# Patient Record
Sex: FEMALE | Race: WHITE | NOT HISPANIC OR LATINO | Employment: FULL TIME | URBAN - METROPOLITAN AREA
[De-identification: names, ages, dates, MRNs, and addresses within clinical notes are randomized per-mention and may not be internally consistent; named-entity substitution may affect disease eponyms.]

---

## 2023-09-17 VITALS
DIASTOLIC BLOOD PRESSURE: 70 MMHG | SYSTOLIC BLOOD PRESSURE: 118 MMHG | WEIGHT: 189 LBS | BODY MASS INDEX: 29.66 KG/M2 | HEART RATE: 72 BPM | HEIGHT: 67 IN

## 2023-09-19 ENCOUNTER — HOSPITAL ENCOUNTER (OUTPATIENT)
Dept: DATA CONVERSION | Facility: HOSPITAL | Age: 48
Discharge: HOME | End: 2023-09-19
Payer: COMMERCIAL

## 2023-09-19 DIAGNOSIS — M21.70 UNEQUAL LIMB LENGTH (ACQUIRED), UNSPECIFIED SITE: ICD-10-CM

## 2023-09-19 DIAGNOSIS — M25.552 PAIN IN LEFT HIP: ICD-10-CM

## 2023-09-19 DIAGNOSIS — M47.27 OTHER SPONDYLOSIS WITH RADICULOPATHY, LUMBOSACRAL REGION: ICD-10-CM

## 2023-09-29 ENCOUNTER — HOSPITAL ENCOUNTER (OUTPATIENT)
Dept: DATA CONVERSION | Facility: HOSPITAL | Age: 48
Discharge: HOME | End: 2023-09-29
Payer: COMMERCIAL

## 2023-09-29 DIAGNOSIS — Z51.89 ENCOUNTER FOR OTHER SPECIFIED AFTERCARE: ICD-10-CM

## 2023-10-04 PROBLEM — M79.7 FIBROMYALGIA: Status: ACTIVE | Noted: 2023-10-04

## 2023-10-04 PROBLEM — M25.469 EDEMA OF KNEE: Status: ACTIVE | Noted: 2023-10-04

## 2023-10-04 PROBLEM — S86.312A STRAIN OF PERONEAL TENDON OF LEFT FOOT: Status: ACTIVE | Noted: 2023-10-04

## 2023-10-04 PROBLEM — T14.8XXA BRUISING: Status: ACTIVE | Noted: 2023-10-04

## 2023-10-04 PROBLEM — F41.9 ANXIETY: Status: ACTIVE | Noted: 2023-10-04

## 2023-10-04 PROBLEM — D17.24 LIPOMA OF LEFT LOWER EXTREMITY: Status: ACTIVE | Noted: 2023-10-04

## 2023-10-04 PROBLEM — S83.90XA SPRAIN OF KNEE: Status: ACTIVE | Noted: 2023-10-04

## 2023-10-04 PROBLEM — S83.412A SPRAIN OF MEDIAL COLLATERAL LIGAMENT OF LEFT KNEE: Status: ACTIVE | Noted: 2023-10-04

## 2023-10-04 PROBLEM — S92.353A CLOSED FRACTURE OF FIFTH METATARSAL BONE: Status: ACTIVE | Noted: 2023-10-04

## 2023-10-04 PROBLEM — M62.9 HAMSTRING TIGHTNESS OF BOTH LOWER EXTREMITIES: Status: ACTIVE | Noted: 2023-10-04

## 2023-10-04 PROBLEM — F32.A DEPRESSION: Status: ACTIVE | Noted: 2023-10-04

## 2023-10-04 PROBLEM — M79.672 LEFT FOOT PAIN: Status: ACTIVE | Noted: 2023-10-04

## 2023-10-04 PROBLEM — S83.512A SPRAIN OF ANTERIOR CRUCIATE LIGAMENT OF LEFT KNEE: Status: ACTIVE | Noted: 2023-10-04

## 2023-10-04 PROBLEM — S93.402A SPRAIN OF LEFT ANKLE: Status: ACTIVE | Noted: 2023-10-04

## 2023-10-04 PROBLEM — M22.90: Status: ACTIVE | Noted: 2023-10-04

## 2023-10-04 PROBLEM — M21.612 BUNION OF LEFT FOOT: Status: ACTIVE | Noted: 2023-10-04

## 2023-10-04 PROBLEM — S83.242A TEAR OF MEDIAL MENISCUS OF LEFT KNEE, CURRENT: Status: ACTIVE | Noted: 2023-10-04

## 2023-10-04 RX ORDER — FERROUS SULFATE, DRIED 160(50) MG
1 TABLET, EXTENDED RELEASE ORAL 2 TIMES DAILY
COMMUNITY
End: 2023-11-29 | Stop reason: SDUPTHER

## 2023-10-04 RX ORDER — ASCORBIC ACID 500 MG
TABLET ORAL
COMMUNITY

## 2023-10-04 RX ORDER — FLUTICASONE PROPIONATE AND SALMETEROL 100; 50 UG/1; UG/1
1 POWDER RESPIRATORY (INHALATION)
COMMUNITY
End: 2023-11-29 | Stop reason: ALTCHOICE

## 2023-10-04 RX ORDER — DICYCLOMINE HYDROCHLORIDE 10 MG/1
CAPSULE ORAL
COMMUNITY
Start: 2013-04-17 | End: 2023-11-29 | Stop reason: ALTCHOICE

## 2023-10-04 RX ORDER — MILNACIPRAN HYDROCHLORIDE 12.5-25-5
KIT ORAL
COMMUNITY
Start: 2013-05-01 | End: 2023-11-29 | Stop reason: ALTCHOICE

## 2023-10-04 RX ORDER — HYDROCODONE BITARTRATE AND ACETAMINOPHEN 5; 325 MG/1; MG/1
TABLET ORAL
COMMUNITY
Start: 2012-07-31 | End: 2023-11-29 | Stop reason: ALTCHOICE

## 2023-10-04 RX ORDER — GABAPENTIN 100 MG/1
CAPSULE ORAL
COMMUNITY
End: 2023-11-29 | Stop reason: ALTCHOICE

## 2023-10-04 RX ORDER — BUSPIRONE HYDROCHLORIDE 15 MG/1
15 TABLET ORAL 2 TIMES DAILY
COMMUNITY
Start: 2013-05-17 | End: 2023-11-29 | Stop reason: ALTCHOICE

## 2023-10-04 RX ORDER — NAPROXEN 500 MG/1
500 TABLET ORAL EVERY 12 HOURS PRN
COMMUNITY

## 2023-10-04 RX ORDER — ETONOGESTREL AND ETHINYL ESTRADIOL VAGINAL RING .015; .12 MG/D; MG/D
RING VAGINAL
COMMUNITY
Start: 2011-11-07 | End: 2023-11-29 | Stop reason: ALTCHOICE

## 2023-10-04 RX ORDER — GABAPENTIN 300 MG/1
CAPSULE ORAL
COMMUNITY
Start: 2013-04-30 | End: 2023-11-29 | Stop reason: ALTCHOICE

## 2023-10-04 RX ORDER — FERROUS SULFATE 325(65) MG
1 TABLET ORAL DAILY
COMMUNITY

## 2023-10-04 RX ORDER — VENLAFAXINE 100 MG/1
150 TABLET ORAL DAILY
COMMUNITY

## 2023-10-04 RX ORDER — PROMETHAZINE HYDROCHLORIDE 25 MG/1
TABLET ORAL
COMMUNITY
Start: 2012-06-07 | End: 2023-11-29 | Stop reason: ALTCHOICE

## 2023-10-06 ENCOUNTER — TREATMENT (OUTPATIENT)
Dept: PHYSICAL THERAPY | Facility: CLINIC | Age: 48
End: 2023-10-06
Payer: COMMERCIAL

## 2023-10-06 DIAGNOSIS — S39.012A STRAIN OF LUMBAR REGION: ICD-10-CM

## 2023-10-06 PROCEDURE — 97110 THERAPEUTIC EXERCISES: CPT | Mod: GP | Performed by: PHYSICAL THERAPIST

## 2023-10-06 PROCEDURE — 97014 ELECTRIC STIMULATION THERAPY: CPT | Mod: GP | Performed by: PHYSICAL THERAPIST

## 2023-10-06 PROCEDURE — 97140 MANUAL THERAPY 1/> REGIONS: CPT | Mod: GP | Performed by: PHYSICAL THERAPIST

## 2023-10-06 NOTE — PROGRESS NOTES
Physical Therapy    Physical Therapy    Physical Therapy Treatment    Patient Name: Alyson Jones  MRN: [id number]  Today's Date: 10/6/2023    Time Calculation  Start Time: 0905  Stop Time: 0955  Time Calculation (min): 50 min    Visit #: 3 out of 10  Evaluation date: 9/20/23    Current Problem:   1. Strain of lumbar region  PT eval and treat    Follow Up In Physical Therapy          ASSESSMENT:   The patient voiced no changes with pain between pre and post session.  The patient noted increased discomfort with bed / mat mobility.     PLAN:   Continue with plan of care.    Skilled PT   1 time per week   Duration:8-10 weeks    Good  Patient         SUBJECTIVE:   The patient reports feeling more sore after her last PT session.  She was able to participate with volleyball and did complete a HIIT workout routine.  Pt has an MRI tomorrow.  Returns to Dr. Esparza next Friday.    Will discuss additional PT sessions at that time.     Precautions: None        Pain: Current pain level: 3/10 at present.  Pt noted no pain change during session.         OBJECTIVE:    Tender at left lumbar paraspinals.    Treatments:  Therapeutic Exercise: (15 minutes)  Prone Hip Extension 3 x 10 each.  Hooklying trunk rotation x 20 each direction.  Pirformis stretching 2 x 30 seconds each.  PPT x 10 reps.    Manual Therapy: (15 minutes)  Myofascial Release to left lumbar paraspinals.  STM to lumbar paraspinals.  Leg pull 2 x 2 minutes each.    IFC to left lumbar Paraspinals x 12 minutes.    HEP:  Pt instructed to increase frequency of stretching activities.    Goals:   3 visits (STG)  1. The patient will be independent with HEP.  2. Left knee pain to 2/10 or less with WB activity.  3. The patient will be independent with posture correction.    10 visits (LTG)  1. The patient will be independent and compliant with updated home exercise program.  2. The patient to rotate trunk to the right and left to look over shoulder when driving to safely  switch lanes and back up the car.  3. Patient to demonstrate the use of proper body mechanics and posture when performing ADLs and picking up objects > 10 pounds to reduce symptoms for self care.  4. Patient to sleep uninterrupted for six hours to allow for better ability to complete ADLs.  5. Patient to be able to load and unload  without increased pain in low back and will demonstrate good body mechanics.  6. Decrease subjective low back pain to 0/10.  7. Patient will be able to stand for over 30 minutes without pain increase in order to prepare meals for self care independence.  8. Oswestry to indicate 10% impairment or less.  9. LE Strength to 5/5.

## 2023-10-07 ENCOUNTER — ANCILLARY PROCEDURE (OUTPATIENT)
Dept: RADIOLOGY | Facility: CLINIC | Age: 48
End: 2023-10-07
Payer: COMMERCIAL

## 2023-10-07 DIAGNOSIS — M62.830 MUSCLE SPASM OF BACK: ICD-10-CM

## 2023-10-07 DIAGNOSIS — M99.04 SEGMENTAL AND SOMATIC DYSFUNCTION OF SACRAL REGION: ICD-10-CM

## 2023-10-07 DIAGNOSIS — M51.36 OTHER INTERVERTEBRAL DISC DEGENERATION, LUMBAR REGION: ICD-10-CM

## 2023-10-07 DIAGNOSIS — S39.012A STRAIN OF MUSCLE, FASCIA AND TENDON OF LOWER BACK, INITIAL ENCOUNTER: ICD-10-CM

## 2023-10-07 DIAGNOSIS — M46.36: ICD-10-CM

## 2023-10-07 DIAGNOSIS — M47.27 OTHER SPONDYLOSIS WITH RADICULOPATHY, LUMBOSACRAL REGION: ICD-10-CM

## 2023-10-07 DIAGNOSIS — M54.10 RADICULOPATHY, SITE UNSPECIFIED: ICD-10-CM

## 2023-10-07 PROCEDURE — 72148 MRI LUMBAR SPINE W/O DYE: CPT

## 2023-10-12 NOTE — PROGRESS NOTES
"Verbal consent of the patient and/or verbal parental consent for patients under the age of 18 have been obtained to conduct a physical examination at this office visit.    Established patient  History Of Present Illness  10/13/23 Alyson Jones is a 48 y.o. female who presents to review their LUMBAR spine MRI. They are currently in Physical Therapy and are performing their home exercise program as directed by PT. They describe their pain as sharp and throbbing in her lower back radiating into her upper back and LEFT posterior hip. They state walking,bending, prolonged standing, and sports exacerbates pain. They are currently utilizing Naproxen 500 mg alternating with OTC Tylenol and ice/moist heat  for pain management with mild, temporary relief. Patient had to discontinue Prednisone taper as she did not feel well while taking it and states that she just \"felt out of it\" They rate their pain as 7/10 today. They are wearing recommended supportive footwear and 6mm heel lift in their Right shoe. They are not also taking previously recommended supplements. She denies any numbness or tingling in her lower extremities. She received a cortisone shot previously and she believes it helped but only for a little while. However, the pain after receiving the shot was very painful. She states that she is unable to sleep throughout the night because of the pain. Additionally, she discusses that she has recently gained 30 pounds and has been doing test and all her exam come back normal. She has states that she exercises and eats healthily but still has gained weight.     All previous Progress Notes and imaging results related to this patients chief complaint have been reviewed in preparation for this examination.    Historical Clinical Intake  September 19, 2023 Verbal consent of the patient and/or verbal parental consent for patients under the age of 18 have been obtained to conduct a physical examination at this office " visitVicky Shields is a 48 year old, well established female patient, who presents for an initial evaluation of her LUMBAR spine and LEFT hip pain. She denies any specific NELSY, fall, or injury, but notes exacerbation of pain since injuring her left knee in May 2023. She remains active as she exercises in the gym routinely, plays recreation volleyball, participates in marathons, and is currently taking figure skating lessons. She was a roller derby athlete for a number of years as well. She describes her left sided low back pain as an ache that is tender to the touch which will radiate into her LEFT hip. She says pain is most notable when changing positions from sitting to standing. Alyson states pain will become sharp on occasion. She denies any numbness or tingling. She rates her pain at 5/10 today. She takes OTC Naproxen for mild, temporary relief. She says she has been playing on 3 different volleyball teams as well as working out and running regularly and cycling on her bike as well. She says it is usually worse when she has multiple things throughout the day but not giving her issues whenever she is standing. Periodically she does get some radiation into her left hip. She says at times her left leg feels like it is heavy and at times both legs feel like they are not responding like they usually do. We talked in detail about repetitive overuse and I told her she needs to really focus on cutting out some of her activities or at least giving herself a good break in between them that she is developing a lot of repetitive overuse stuff. We also talked about the importance of her mechanics and good shoes and good insoles and making sure she stretches regularly. She says over-the-counter NSAIDs and Tylenol does not seem to be helping. She has been doing physical therapy regularly as well over the past 6 to 8 weeks and that has not been helping either.    Past Medical History  She has a past medical history of  Concussion, Depression, and Fibromyalgia.    Surgical History  She has a past surgical history that includes Cholecystectomy (05/17/2013).     Social History  She reports that she has never smoked. She has never used smokeless tobacco. She reports that she does not currently use alcohol. She reports that she does not use drugs.    Family History  Family History   Problem Relation Name Age of Onset    No Known Problems Mother      No Known Problems Father          Allergies  Penicillins and Prednisone    Review of Systems  Review of Systems:  CONSTITUTIONAL:   Negative for weight change, loss of appetite, fatigue, weakness, fever, chills, night sweats, headaches .           HEENT:   Negative for cold, cough, sore throat, sinus pain, swollen lymph nodes.           OPHTHALMOLOGY:   Negative for diminished vision, blurred vision, loss of vision, double vision.           ALLERGY:   Negative for runny nose, scratchy throat, sinus congestion, rash, facial pressure, nasal congestion, post-nasal drip.           CARDIOLOGY:   Negative for chest pain, palpitations, murmurs, irregular heart beat, shortness of breath, leg edema, dyspnea on exertion, fatigue, dizziness.           RESPIRATORY:   Negative for chest pain, shortness of breath, swelling of the legs, asthma/copd, chest congestion, pain with breathing .           GASTROENTEROLOGY:   Negative for nausea, vomitting, heartburn, constipation, diarrhea, blood in stool, change in bowel habits, black stool.           HEMATOLOGY/LYMPH:   Negative for fatigue, loss of appetitie, easy bruising, easy bleeding, anemia, abnormal bleeding, slow healing.           ENDOCRINOLOGY:   Negative for polyuria, polydipsia, polyphagia, fatigue, weight loss, weight gain, cold intolerance, heat intolerance, diabetes.           MUSCULOSKELETAL:   Positive  for LUMBAR spine pain       DERMATOLOGY:   Negative for rash, bruising.           NEUROLOGY:   Negative for tingling, numbness, gait  abnormality, paresthesias, weakness, sciatica.    General (SPINE):  Location: Lumbar spine with radiculitis into the  LEFT leg.   Erythema: Negative.   Edema: Negative.   Effusion: Negative.   TART Findings: Positive,Tissue Texture Changes,Assymmetry,Restriction,Tenderness paraspinal muscles lower lumbar spine LEFT > RIGHT .   Warmth: Negative.   Ecchymosis/Bruising: Negative.   Percussion Test (LUMBAR): Negative.   Tuning Fork Test (LUMBAR): Negative.   Percussion (Sacrum): Negative.   Tuning Fork (Sacrum): Negative.   Abrasions: Negative.   Orientation (LUMBAR): Positive: Decreased,Lumbar,Lordosis,because of muscle spasms.   Orientation (Sacrum): Negative.     ROM (LUMBAR): Positive, Decreased because of pain, Forward Flexion, Extension, Lateral Bending (Side Bending), and Twisting (Rotation).   ROM (Sacrum): , Positive: , LEFT , , RIGHT rotation on RIGHT axis.     Muscle Strength:   +5/+5 Hamstring Flexion  +5/+5 Quadricep Extension            +5/+5 Hip Flexion  +5/+5 Hip Extension            +5/+5 Hip ABduction toward body  +5/+5 Hip ADduction away from body                   +5/+5 Hip Internal Rotation at 90 Degrees  +5/+5 Hip External Rotation at 90 Degrees                +5/+5 Hip Internal Rotation at 0 Degrees  +5/+5 Hip External Rotation at 0 Degrees.     DTR/Neurological: 2-Point Discrimination:Negative,Toe Walk normal,Heel Walk normal ,   +2/+4 Patellar Reflex (L-4)  +2/+4 Posterior Tibialis and Medial Hamstrings Reflex (L5)  +2/+4 Achilles Reflex (S-1).     Sensation/Neurological Lumbar: Positive, Decreased,  L1: Low back, hips, and groin  L2: Low back and front of inside of upper leg/thigh  L3: Low back and front of upper leg/thigh  Positive, L4: Low back, front of upper leg/thigh, front of lower leg/calf, front of medial area of knee, and inside of ankle  Positive, L5: Low back, front and lateral knee, front and outside of lower leg/calf, top and bottom of foot and first four toes especially big toe.      Sensation/Neurological Sacrum: Postive, Decreased,  Positive, S1: low back, back of upper leg/thigh, back and inside of lower leg, calf and little toe  S2: Buttocks, genitals, back of upper leg/thigh and calves  S3: Buttocks and genitals  S4: Buttocks  S5: Buttocks.     Sensation/Neurological Coccygeal: Negative, Sensation Intact, 2-Point Discrimination: Negative  Coccyx: Buttocks and area of tailbone.   Palpation: Positive,Tender to Palpation,Lumbar spine as well as,Paraspinal Musculature, and SI joint  LEFT > RIGHT L1-S1 LEFT worse than RIGHT .     Vascular: Capillary Refill < 2 seconds  +2/+4Carotid  +2/+4 Dorsalis Pedis  +2/+4 Posterior Tibial.   Feet/Foot B/L Valgus Foot.     Range of Motion of the Spine:  Overview of Tests for Evaluating Spinal Function: Negative.   Bvpzmlldpj-mu-mmktg Distance Test in Flexion: Positive.   Shannon Sign: Negative.   Schober Sign: Negative.   Skin-Rolling Test (Olu Fold Test): Negative.     Low Back-Disc Injury:  Valsalva Maneuver: Negative.   Lhermitte's Sign: Negative.   Fermoral Nerve Traction Test: Positive.   Slump Test: Positive.   Cross Test Seated: Positive.   Seated Straight Leg Raise: Positive.   Laseague Sign: Positive.   Laseague Differential Test: Positive.   Laseague Drop Test: Positive.   Seated Laseague Test: Positive.   Reverse Laseague Test: Positive.   Bonnet Piriformis Test: Negative.   Bragard Test: Negative.   Duchenne Trendelenberg Test: Negative.   Kernig-Brudzinski Test: Negative.   Tip Toe Heel Walking Test: Negative.   Elizabeth Prone Knee Flexion Test: Positive.     Low Back-Hip:  Earl/KYLE Test: Negative.   FADIR Test: Negative.   Iliolumbar Ligament Test: Negative.   Sacrospinous and SI Ligament Test: Negative.   Sacrotuberal Ligament Test: Negative.   Psoas Sign: Positive     Low Back-Sciatica:  Bowden Test: Negative.     Low Back-SI Joint:  Three-Phase Hyperextension Test: Positive.   Spine Test: Negative.   Yeoman Test: Negative.   Marlon  "Test: Negative.   Sacroilliac Stress Test: Positive.   Abduction Stress Test: Positive.     Low Back-Spondy:  Stork Test: Negative.   Sphinx Test: , Positive.   Modified Sphinx Test: Negative.     Hip/Pelvis - Sacrum:  Leg Length Supine: , Positive: , RIGHT shorter than LEFT , Will verify with standing erect pelvis X-ray.   Leg Length Supine to Seated (Derbolowsky Sign): , Positive: , RIGHT shorter than LEFT , Will verify with standing erect pelvis X-ra.   Standing Flexion Test: , Positive: , LEFT.   Seated Flexion Test: , Positive: , LEFT.   Spring Test: , Negative.   Sacral Somatic Dysfunction: , Positive: , RIGHT rotation on RIGHT axis.   Sacroiliac (SI) Joint Fixation Test:  Positive.   Hip Flexor Tightness: , Positive: , RIGHT > LEFT.   Hamstring Tightness:  Positive LEFT > RIGHT .      Last Recorded Vitals  /76 (BP Location: Right arm, Patient Position: Sitting, BP Cuff Size: Large adult)   Pulse 80   Ht 1.702 m (5' 7\")   Wt 95.3 kg (210 lb)   BMI 32.89 kg/m²      Imaging and Diagnostics Review:  MR LUMBAR SPINE WO IV CONTRAST; 10/7/2023 9:00 am  INDICATION: Signs/Symptoms:MRI LUMBAR WO.  COMPARISON: None    ACCESSION NUMBER(S): [id number]  ORDERING CLINICIAN: FABIOLA JONES      TECHNIQUE: PROCEDURE: T1 and T2 sagittal and axial images were obtained on a 1.5  Iram MRI without contrast.      FINDINGS:  There is preserved lordosis. Marrow signal is indicative of an prominent vascular bundle L5 posterior mid vertebral body, normal variant. The conus medullaris ends at the inferior end plate of T12 and is normal. Vertebral body heights are preserved. No wedge compression or compression injury. Posterior elements are intact. Tarlov cysts are seen bilaterally posterior to S2 and S3 there is mild disc desiccation and disc height loss at each level of the lumbar spine with minimal disc bulge on the sagittal sequences and widely patent canal at each level.      Cystic structure right adnexa is simple in " appearance by MR measures 2.6 cm diameter.      Axial images demonstrate the following:    T12/L1: No disc osteophyte complex, foraminal narrowing or canal stenosis. Normal.      L1/2: No disc osteophyte complex, foraminal narrowing or canal stenosis. Normal.      L2/3: No disc osteophyte complex, foraminal narrowing or canal stenosis. Normal.      L3/4: There is a minimal disc bulge without canal stenosis or foraminal narrowing with mild ligamentum flavum hypertrophy and facet hypertrophy.      L4/5: Mild broad-based disc bulge and mild to moderate ligamentum flavum hypertrophy with very mild left-greater-than-right facet hypertrophy without foraminal narrowing or canal stenosis.      L5/S1: Mild-to-moderate broad-based disc bulge is seen without foraminal narrowing or canal stenosis as the thecal sac tapers.      IMPRESSION:  1. Mild spondylosis lumbar spine as described without foraminal narrowing or canal stenosis.  2. Right adnexal cyst likely within the ovary. Routine outpatient pelvic ultrasound with transvaginal imaging recommended for further evaluation. Follow up with PCP and OB-GYN  3. Disc bulges L3-L4, L4-L5, and L5-S1      Signed by: Regulo Fry 10/7/2023 11:42 AM  Dictation workstation:   [id number]  ------------------------------------------  PROCEDURE:         SPINE LUMBOSACRAL MIN 4 VIEW - TXR  0034  REASON FOR EXAM: OSTEOARTHRITIS OF SPINE WITH RADICULOPATHY,LUMBOSACRAL REGION  RESULT: MRN: [id number]     Patient Name: SIERRA RUSSELL  STUDY: SPINE LUMBOSACRAL MIN 4 VIEW 9/19/2023 10:21 am  INDICATION: OSTEOARTHRITIS OF SPINE WITH RADICULOPATHY,LUMBOSACRAL REGION 48-year-old  woman with lower back pain, left hip pain, possible leg length discrepancy.  COMPARISON: Lumbar spine radiographs 05/28/2013     ACCESSION NUMBER(S): [id number]  ORDERING CLINICIAN: FABIOLA JONES     TECHNIQUE: 5 views Of the lumbar spine were performed.     FINDINGS:  Lumbar vertebral body heights are preserved. No  compression deformity detected. There is no sign of spondylolisthesis. Small anterior osteophyte formation is seen at the superior endplate of T11. Lumbar disc heights overall appear maintained. There is facet hypertrophy at L3-L4 through L5-S1.     IMPRESSION:  Mild lumbar spondylosis, as detailed above.     Dictation workstation:   [id number]   Original Interpreting Physician:   JAIMEE MARVIN MD  Original Transcribed by/Date: [facility] Sep 19 2023  9:43A  ------------------------------------------  Left leg shorter than right leg by the following:  Iliac crest:  9.5 mm  Sacral base:  6.4 mm  Midline femoral heads:  5.2 mm  Median difference of the left leg being shorter than right leg is approximately:  7.03 mm     PROCEDURE:         PELVIS 1 OR 2 VIEW - TXR  0039  REASON FOR EXAM: OSTEOARTHRITIS OF SPINE WITH RADICULOPATHY,LUMBOSACRAL REGION  RESULT: MRN: [id number]     Patient Name: SIERRA RUSSELL  STUDY: PELVIS 1 OR 2 VIEW; 9/19/2023 10:21 am  INDICATION: OSTEOARTHRITIS OF SPINE WITH RADICULOPATHY,LUMBOSACRAL REGION. 48-year-old  woman with lower back pain, left hip pain, possible leg length discrepancy.  COMPARISON: None available.     ACCESSION NUMBER(S): [id number]  ORDERING CLINICIAN: FABIOLA JONES     TECHNIQUE: Single weight-bearing AP view of the pelvis obtained.     FINDINGS:  Bilateral pelvic bones overall appear intact. The ilioischial and iliopectineal lines are maintained. Presumed bilateral tubal ligation clips overlying the pelvis. On this weight-bearing AP view, the right femoral head is located approximately 5 mm above the left.     IMPRESSION:  On this upright weight-bearing view of the pelvis, the right femoral head is located approximately 5 mm above the left.    Dictation workstation:   [id number]     Original Interpreting Physician:   JAIMEE MARVIN MD  Original Transcribed by/Date: [facility] Sep 19 2023  9:43A  ------------------------------------------  PROCEDURE:         HIP LT 2 VIEW W  OR WO AP PELVIS - TXR  [id number]  REASON FOR EXAM: LEFT HIP PAIN  RESULT: MRN: [id number]     Patient Name: SIERRA RUSSELL  STUDY: HIP LT 2 VIEW W OR WO AP PELVIS; 9/19/2023 10:21 am  INDICATION: LEFT HIP PAIN. 48-year-old woman with lower back pain, left hip pain, possible leg length discrepancy.  COMPARISON: None available.     ACCESSION NUMBER(S): [id number]  ORDERING CLINICIAN: FABIOLA JONES     TECHNIQUE: Two views of the left hip were obtained.     FINDINGS:  No sign of acute left hip fracture or dislocation. The left femoroacetabular joint space overall appears maintained. No radiopaque foreign body. Presumed tubal ligation clip overlying the left hemipelvis.     IMPRESSION:  No sign of acute left hip fracture or dislocation.    Dictation workstation:   [id number]  Original Interpreting Physician:   JAIMEE MARVIN MD  Original Transcribed by/Date: [facility] Sep 19 2023  9:43A        Assessment   1. Other spondylosis, lumbar region  Referral to Physical Therapy    Mild spondylosis lumbar spine as described without foraminal narrowing or canal stenosis.      2. Acute low back pain, unspecified back pain laterality, unspecified whether sciatica present  Referral to Physical Therapy      3. Cyst of right ovary  Referral to Physical Therapy    Right adnexal cyst likely within the ovary. Routine outpatient pelvic ultrasound with transvaginal imaging recommended for further evaluation      4. Bulging lumbar disc  Referral to Physical Therapy    disc bulges L3-L4, L4-L5, and L5-S1      5. Lumbosacral strain, subsequent encounter  Referral to Physical Therapy      6. Radiculitis  Referral to Physical Therapy      7. Osteoarthritis of spine with radiculopathy, lumbosacral region  Referral to Physical Therapy      8. Discitis of lumbar region  Referral to Physical Therapy      9. DDD (degenerative disc disease), lumbar  Referral to Physical Therapy      10. Paraspinal muscle spasm  Referral to Physical Therapy      11.  Somatic dysfunction of lumbar region  Referral to Physical Therapy      12. Somatic dysfunction of sacral region  Referral to Physical Therapy      13. Left hip pain  Referral to Physical Therapy      14. Strain of left hip adductor muscle, subsequent encounter  Referral to Physical Therapy      15. Iliotibial band syndrome, left leg  Referral to Physical Therapy      16. Hamstring tightness  Referral to Physical Therapy      17. Hip flexor tightness, unspecified laterality  Referral to Physical Therapy      18. Leg length discrepancy  Referral to Physical Therapy    Median difference of the left leg being shorter than right leg is approximately:  7.03 mm      19. Valgus deformity of both feet  Referral to Physical Therapy          Treatment or Intervention:   May continue to alternate ice and moist heat as needed   Reviewed handout on herniated/bulging disc(s) in detail with the patient to the level of their understanding; a copy of this handout was provided to the patient at the time of this office visit.   May give patient a spinal orthosis in the future  Start into physical therapy 1-2 times a week for 10-12 weeks with manual therapy, dry needling, IASTM, and cervical traction  Reviewed home stretching exercises to be performed by the patient routinely #6 Stressed the importance of wearing shoes with good stability control to help with the biomechanics affecting the spine  Stressed the importance of wearing full foot insoles to help with the biomechanics affecting the spine and to provide cushioning  Recommendation patient verify coverage with their insurance for custom orthotics and to advise the office so a referral to podiatry can be provided    Recommendation over-the-counter vitamin-D 2 -3000+ milligrams a day, as well as a daily multivitamin.  Recommendation to continue over-the-counter curcumin, turmeric, boswellia, as well as egg shell membrane as directed to aid with joint inflammation.  Recommendation  to continue over-the-counter Move Free for joint health.  May continue to take previously prescribed Flexeril (Cyclobenzaprine) 10 milligrams, may take 1 tablet 1-2 hours before bedtime as needed if have to go to school or work the next day otherwise can take 1 pill up to 3 times a day as needed for muscle relaxation; The patient may alternate with OTC Tylenol Extra Strength or OTC Tylenol Arthritis, taking one every 6-8 hours with food as needed.  Patient advised regarding the risks and/or potential adverse reactions and/or side effects of any prescribed medications along with any over-the-counter medications or any supplements used. Patient advised to seek immediate medical care if any adverse reactions occur. The patient and/or patient(s) parent(s) verbalized their understanding   At the patient's next office visit, will provide appropriate 6 millimeter heel lift to be placed in the LEFT shoe to accommodate for leg length discrepancy found on standing erect pelvis xray  Possibility in the future of LEFT hip MRI or MR Arthrogram to rule out labrum tear versus strain versus DJD versus other.  Discussed the importance of modifying her activity level for the time being as she is doing a lot of repetitive activity  Discussed to take the Naproxen, prescribed from another Doctor, twice a day for pain. However, recommended to try Move-Free first and if it does not help then take Naproxen.   Went over workout modifications with the patient  Possibility of future referral for epidural injections  Follow-up in 6-8 weeks for a reevaluation of the patients lumbar spine or sooner as needed.       Diagnostic studies:  No additional imaging or laboratory studies are needed at this time.    Activity Instructions, Restrictions, and Accommodations:  No activity limitations or modifications are needed at this time.    Consultations/Referrals:  None at this time.    Follow-up in 6-8 weeks for a reevaluation of the patients lumbar  spine or sooner as needed.     FABIOLA JONES on 10/13/23 at 10:53 AM.     Fabiola STAHL. Isaias ALVARADO, FAOASM

## 2023-10-13 ENCOUNTER — OFFICE VISIT (OUTPATIENT)
Dept: SPORTS MEDICINE | Facility: CLINIC | Age: 48
End: 2023-10-13
Payer: COMMERCIAL

## 2023-10-13 VITALS
DIASTOLIC BLOOD PRESSURE: 76 MMHG | SYSTOLIC BLOOD PRESSURE: 124 MMHG | WEIGHT: 210 LBS | BODY MASS INDEX: 32.96 KG/M2 | HEIGHT: 67 IN | HEART RATE: 80 BPM

## 2023-10-13 DIAGNOSIS — M47.27 OSTEOARTHRITIS OF SPINE WITH RADICULOPATHY, LUMBOSACRAL REGION: ICD-10-CM

## 2023-10-13 DIAGNOSIS — M21.70 LEG LENGTH DISCREPANCY: ICD-10-CM

## 2023-10-13 DIAGNOSIS — M25.552 LEFT HIP PAIN: ICD-10-CM

## 2023-10-13 DIAGNOSIS — M46.46 DISCITIS OF LUMBAR REGION: ICD-10-CM

## 2023-10-13 DIAGNOSIS — S76.012D STRAIN OF LEFT HIP ADDUCTOR MUSCLE, SUBSEQUENT ENCOUNTER: ICD-10-CM

## 2023-10-13 DIAGNOSIS — M62.89 HAMSTRING TIGHTNESS: ICD-10-CM

## 2023-10-13 DIAGNOSIS — M47.896 OTHER SPONDYLOSIS, LUMBAR REGION: Primary | ICD-10-CM

## 2023-10-13 DIAGNOSIS — M62.830 PARASPINAL MUSCLE SPASM: ICD-10-CM

## 2023-10-13 DIAGNOSIS — M76.32 ILIOTIBIAL BAND SYNDROME, LEFT LEG: ICD-10-CM

## 2023-10-13 DIAGNOSIS — M24.559 HIP FLEXOR TIGHTNESS, UNSPECIFIED LATERALITY: ICD-10-CM

## 2023-10-13 DIAGNOSIS — Q66.6 VALGUS DEFORMITY OF BOTH FEET: ICD-10-CM

## 2023-10-13 DIAGNOSIS — M51.36 DDD (DEGENERATIVE DISC DISEASE), LUMBAR: ICD-10-CM

## 2023-10-13 DIAGNOSIS — M54.50 ACUTE LOW BACK PAIN, UNSPECIFIED BACK PAIN LATERALITY, UNSPECIFIED WHETHER SCIATICA PRESENT: ICD-10-CM

## 2023-10-13 DIAGNOSIS — M99.04 SOMATIC DYSFUNCTION OF SACRAL REGION: ICD-10-CM

## 2023-10-13 DIAGNOSIS — N83.201 CYST OF RIGHT OVARY: ICD-10-CM

## 2023-10-13 DIAGNOSIS — M99.03 SOMATIC DYSFUNCTION OF LUMBAR REGION: ICD-10-CM

## 2023-10-13 DIAGNOSIS — M51.36 BULGING LUMBAR DISC: ICD-10-CM

## 2023-10-13 DIAGNOSIS — M54.10 RADICULITIS: ICD-10-CM

## 2023-10-13 DIAGNOSIS — S39.012D LUMBOSACRAL STRAIN, SUBSEQUENT ENCOUNTER: ICD-10-CM

## 2023-10-13 PROCEDURE — 99214 OFFICE O/P EST MOD 30 MIN: CPT | Performed by: FAMILY MEDICINE

## 2023-10-13 PROCEDURE — 1036F TOBACCO NON-USER: CPT | Performed by: FAMILY MEDICINE

## 2023-10-13 ASSESSMENT — PAIN - FUNCTIONAL ASSESSMENT: PAIN_FUNCTIONAL_ASSESSMENT: 0-10

## 2023-10-13 ASSESSMENT — PAIN SCALES - GENERAL
PAINLEVEL_OUTOF10: 7
PAINLEVEL: 7

## 2023-10-13 ASSESSMENT — PAIN DESCRIPTION - DESCRIPTORS: DESCRIPTORS: ACHING;BURNING;SHOOTING;SHARP

## 2023-10-13 NOTE — PATIENT INSTRUCTIONS
May continue to alternate ice and moist heat as needed   Reviewed handout on herniated/bulging disc(s) in detail with the patient to the level of their understanding; a copy of this handout was provided to the patient at the time of this office visit.   May give patient a spinal orthosis in the future  Start into physical therapy 1-2 times a week for 10-12 weeks with manual therapy, dry needling, IASTM, and cervical traction  Reviewed home stretching exercises to be performed by the patient routinely #6 Stressed the importance of wearing shoes with good stability control to help with the biomechanics affecting the spine  Stressed the importance of wearing full foot insoles to help with the biomechanics affecting the spine and to provide cushioning  Recommendation patient verify coverage with their insurance for custom orthotics and to advise the office so a referral to podiatry can be provided #9 Recommendation over-the-counter vitamin-D 2 -3000+ milligrams a day, as well as a daily multivitamin.  Recommendation to continue over-the-counter curcumin, turmeric, boswellia, as well as egg shell membrane as directed to aid with joint inflammation.  Recommendation to continue over-the-counter Move Free for joint health.  May continue to take previously prescribed Flexeril (Cyclobenzaprine) 10 milligrams, may take 1 tablet 1-2 hours before bedtime as needed if have to go to school or work the next day otherwise can take 1 pill up to 3 times a day as needed for muscle relaxation; The patient may alternate with OTC Tylenol Extra Strength or OTC Tylenol Arthritis, taking one every 6-8 hours with food as needed.  Patient advised regarding the risks and/or potential adverse reactions and/or side effects of any prescribed medications along with any over-the-counter medications or any supplements used. Patient advised to seek immediate medical care if any adverse reactions occur. The patient and/or patient(s) parent(s)  verbalized their understanding   At the patient's next office visit, will provide appropriate 6 millimeter heel lift to be placed in the LEFT shoe to accommodate for leg length discrepancy found on standing erect pelvis xray  Possibility in the future of LEFT hip MRI or MR Arthrogram to rule out labrum tear versus strain versus DJD versus other.  Discussed the importance of modifying her activity level for the time being as she is doing a lot of repetitive activity  Discussed to take the Naproxen, prescribed from another Doctor, twice a day for pain. However, recommended to try Move-Free first and if it does not help then take Naproxen.   Follow up in 6-8 weeks or sooner if needed.

## 2023-11-17 ENCOUNTER — TREATMENT (OUTPATIENT)
Dept: PHYSICAL THERAPY | Facility: CLINIC | Age: 48
End: 2023-11-17
Payer: COMMERCIAL

## 2023-11-17 DIAGNOSIS — S39.012A LUMBAR STRAIN: ICD-10-CM

## 2023-11-17 DIAGNOSIS — S39.012A LUMBAR STRAIN: Primary | ICD-10-CM

## 2023-11-17 PROCEDURE — 97014 ELECTRIC STIMULATION THERAPY: CPT | Mod: GP | Performed by: PHYSICAL THERAPIST

## 2023-11-17 PROCEDURE — 97012 MECHANICAL TRACTION THERAPY: CPT | Mod: GP | Performed by: PHYSICAL THERAPIST

## 2023-11-17 PROCEDURE — 97140 MANUAL THERAPY 1/> REGIONS: CPT | Mod: GP | Performed by: PHYSICAL THERAPIST

## 2023-11-17 NOTE — PROGRESS NOTES
Physical Therapy    Physical Therapy    Physical Therapy Treatment    Patient Name: Alyson Jones  MRN: [id number]  Today's Date: 11/17/2023    Time Calculation  Start Time: 0900  Stop Time: 1000  Time Calculation (min): 60 min    Visit #: 4 out of 10  Evaluation date: 9/20/23    Current Problem:   1. Lumbar strain  Follow Up In Physical Therapy          ASSESSMENT:   The patient noted soreness after today's session.  No significant change in pain level.  Pt noted to have left lumbar PS trigger points.     PLAN:   Continue with plan of care.    Skilled PT   1-2 times per week.  Duration:8-10 weeks    SUBJECTIVE:   The patient reports that her back pain continues to be moderate to severe.  She has decreased her participation with volleyball.  Has not jogged in several months due to left knee pain.  She voices frustration at lack of improvement.       Precautions: None     Pain: Current pain level: 3/10 at present.  Pt noted no pain change during session.     OBJECTIVE:    Tender aliong left lumbar paraspinals.    Treatments:  Therapeutic Exercise: (5 minutes)  Prone Hip Extension 3 x 10 each.  Prone UE / LE lift.  PPT x 10 reps.  Sideglides to wall to right.    Manual Therapy: (15 minutes)  Myofascial Release to left lumbar paraspinals.    STM to lumbar paraspinals.    IFC to left lumbar Paraspinals x 15 minutes.    Mechanical Traction (Lumbar)  80 pounds/40 pounds, Hold 60 seconds/rest 20 seconds.  18 minutes Intermittent, with cold pack to full back.    HEP:  Add Prone UE/LE Lifts.    Goals:   3 visits (STG)  1. The patient will be independent with HEP.  2. Left knee pain to 2/10 or less with WB activity.  3. The patient will be independent with posture correction.    10 visits (LTG)  1. The patient will be independent and compliant with updated home exercise program.  2. The patient to rotate trunk to the right and left to look over shoulder when driving to safely switch lanes and back up the car.  3. Patient  to demonstrate the use of proper body mechanics and posture when performing ADLs and picking up objects > 10 pounds to reduce symptoms for self care.  4. Patient to sleep uninterrupted for six hours to allow for better ability to complete ADLs.  5. Patient to be able to load and unload  without increased pain in low back and will demonstrate good body mechanics.  6. Decrease subjective low back pain to 0/10.  7. Patient will be able to stand for over 30 minutes without pain increase in order to prepare meals for self care independence.  8. Oswestry to indicate 10% impairment or less.  9. LE Strength to 5/5.

## 2023-11-20 PROBLEM — M24.559 HIP FLEXOR TIGHTNESS: Status: ACTIVE | Noted: 2023-11-20

## 2023-11-20 PROBLEM — M21.70 LEG LENGTH DISCREPANCY: Status: ACTIVE | Noted: 2023-11-20

## 2023-11-20 PROBLEM — M99.03 SOMATIC DYSFUNCTION OF LUMBAR REGION: Status: ACTIVE | Noted: 2023-11-20

## 2023-11-20 PROBLEM — S39.012A LUMBOSACRAL STRAIN: Status: ACTIVE | Noted: 2023-11-20

## 2023-11-20 PROBLEM — M47.896 OTHER SPONDYLOSIS, LUMBAR REGION: Status: ACTIVE | Noted: 2023-11-20

## 2023-11-20 PROBLEM — M51.369 BULGING LUMBAR DISC: Status: ACTIVE | Noted: 2023-11-20

## 2023-11-20 PROBLEM — M62.830 PARASPINAL MUSCLE SPASM: Status: ACTIVE | Noted: 2023-11-20

## 2023-11-20 PROBLEM — M51.36 BULGING LUMBAR DISC: Status: ACTIVE | Noted: 2023-11-20

## 2023-11-20 PROBLEM — N83.201 CYST OF RIGHT OVARY: Status: ACTIVE | Noted: 2023-11-20

## 2023-11-20 PROBLEM — M54.50 ACUTE LOW BACK PAIN: Status: ACTIVE | Noted: 2023-11-20

## 2023-11-20 PROBLEM — M76.32 ILIOTIBIAL BAND SYNDROME, LEFT LEG: Status: ACTIVE | Noted: 2023-11-20

## 2023-11-20 PROBLEM — M51.369 DDD (DEGENERATIVE DISC DISEASE), LUMBAR: Status: ACTIVE | Noted: 2023-11-20

## 2023-11-20 PROBLEM — S76.012A STRAIN OF LEFT HIP ADDUCTOR MUSCLE: Status: ACTIVE | Noted: 2023-11-20

## 2023-11-20 PROBLEM — M25.552 LEFT HIP PAIN: Status: ACTIVE | Noted: 2023-11-20

## 2023-11-20 PROBLEM — M46.46 DISCITIS OF LUMBAR REGION: Status: ACTIVE | Noted: 2023-11-20

## 2023-11-20 PROBLEM — M47.27 OSTEOARTHRITIS OF SPINE WITH RADICULOPATHY, LUMBOSACRAL REGION: Status: ACTIVE | Noted: 2023-11-20

## 2023-11-20 PROBLEM — M51.36 DDD (DEGENERATIVE DISC DISEASE), LUMBAR: Status: ACTIVE | Noted: 2023-11-20

## 2023-11-20 PROBLEM — M54.10 RADICULITIS: Status: ACTIVE | Noted: 2023-11-20

## 2023-11-20 PROBLEM — M99.04 SOMATIC DYSFUNCTION OF SACRAL REGION: Status: ACTIVE | Noted: 2023-11-20

## 2023-11-20 PROBLEM — M62.89 HAMSTRING TIGHTNESS: Status: ACTIVE | Noted: 2023-11-20

## 2023-11-20 PROBLEM — Q66.6 VALGUS DEFORMITY OF BOTH FEET: Status: ACTIVE | Noted: 2023-11-20

## 2023-11-20 NOTE — PROGRESS NOTES
Verbal consent of the patient and/or verbal parental consent for patients under the age of 18 have been obtained to conduct a physical examination at this office visit.    Established patient  History Of Present Illness  11/29/23 Alyson Jones is a 48 y.o. female who presents for follow up of LUMBAR Spine. Patient states she continues to have pain in lumbar spine L >R that feels tight and radiates up into upper back and occasionally into her LEFT anterior hip/groin and down her LEFT leg. She treats her pain with Aleve, ice, or heat with minimal improvement to pain. She has not been able to participate in volleyball like previously - she is playing 1-2x week instead of 4x week. Pain worsens with sitting or standing for long periods of time as well as exercise. Patient has a difficulty sleeping without taking muscle relaxers. Patient usually wears good supportive footwear with her heel lift in the LEFT shoe. She has purchased insoles that she states she has yet to put into her shoes. She participates in physical therapy and does her HEP with no improvement in pain.  She additionally says lumbar traction with physical therapy seems to make it worse and cause pain and numbness to radiate down into her leg and into her hip worse.  She states traction was painful and felt it was hurting more than helping her back pain so they stopped it. Patient states she started Move Free but had acid reflux. She continues to take collagen and turmeric supplements.   Additionally she says that the pain is radiating up into her thoracic spine on the same side on the left we talked in detail about the next step potentially getting her into get epidural injections in the lower lumbar spine as well as with massage therapist.  Also stressed to her the importance of wearing her heel lift in her left shoe as well as making sure she gets a new para insoles since she cannot find her old ones.    Last Recorded Vitals  BP 96/68   Pulse 88   " Ht 1.702 m (5' 7\")   Wt 96.2 kg (212 lb)   BMI 33.20 kg/m²      All previous Progress Notes and imaging results related to this patients chief complaint have been reviewed in preparation for this examination.    Past Medical History  She has a past medical history of Concussion, Depression, and Fibromyalgia.    Surgical History  She has a past surgical history that includes Cholecystectomy (05/17/2013).     Social History  She reports that she has never smoked. She has never used smokeless tobacco. She reports that she does not currently use alcohol. She reports that she does not use drugs.    Family History  Family History   Problem Relation Name Age of Onset    No Known Problems Mother      No Known Problems Father          Allergies  Penicillins and Prednisone    Historical Clinical Intake  September 19, 2023 Verbal consent of the patient and/or verbal parental consent for patients under the age of 18 have been obtained to conduct a physical examination at this office visit. Alyson is a 48 year old, well established female patient, who presents for an initial evaluation of her LUMBAR spine and LEFT hip pain. She denies any specific NELSY, fall, or injury, but notes exacerbation of pain since injuring her left knee in May 2023. She remains active as she exercises in the gym routinely, plays recreation volleyball, participates in marathons, and is currently taking figure skating lessons. She was a roller derby athlete for a number of years as well. She describes her left sided low back pain as an ache that is tender to the touch which will radiate into her LEFT hip. She says pain is most notable when changing positions from sitting to standing. Alyson states pain will become sharp on occasion. She denies any numbness or tingling. She rates her pain at 5/10 today. She takes OTC Naproxen for mild, temporary relief. She says she has been playing on 3 different volleyball teams as well as working out and " "running regularly and cycling on her bike as well. She says it is usually worse when she has multiple things throughout the day but not giving her issues whenever she is standing. Periodically she does get some radiation into her left hip. She says at times her left leg feels like it is heavy and at times both legs feel like they are not responding like they usually do. We talked in detail about repetitive overuse and I told her she needs to really focus on cutting out some of her activities or at least giving herself a good break in between them that she is developing a lot of repetitive overuse stuff. We also talked about the importance of her mechanics and good shoes and good insoles and making sure she stretches regularly. She says over-the-counter NSAIDs and Tylenol does not seem to be helping. She has been doing physical therapy regularly as well over the past 6 to 8 weeks and that has not been helping either.   --------------------------------------------  10/13/23 Alyson Jones is a 48 y.o. female who presents to review their LUMBAR spine MRI. They are currently in Physical Therapy and are performing their home exercise program as directed by PT. They describe their pain as sharp and throbbing in her lower back radiating into her upper back and LEFT posterior hip. They state walking,bending, prolonged standing, and sports exacerbates pain. They are currently utilizing Naproxen 500 mg alternating with OTC Tylenol and ice/moist heat  for pain management with mild, temporary relief. Patient had to discontinue Prednisone taper as she did not feel well while taking it and states that she just \"felt out of it\" They rate their pain as 7/10 today. They are wearing recommended supportive footwear and 6mm heel lift in their Right shoe. They are not also taking previously recommended supplements. She denies any numbness or tingling in her lower extremities. She received a cortisone shot previously and she " believes it helped but only for a little while. However, the pain after receiving the shot was very painful. She states that she is unable to sleep throughout the night because of the pain. Additionally, she discusses that she has recently gained 30 pounds and has been doing test and all her exam come back normal. She has states that she exercises and eats healthily but still has gained weight.      Review of Systems:  CONSTITUTIONAL:   Negative for weight change, loss of appetite, fatigue, weakness, fever, chills, night sweats, headaches .           HEENT:   Negative for cold, cough, sore throat, sinus pain, swollen lymph nodes.           OPHTHALMOLOGY:   Negative for diminished vision, blurred vision, loss of vision, double vision.           ALLERGY:   Negative for runny nose, scratchy throat, sinus congestion, rash, facial pressure, nasal congestion, post-nasal drip.           CARDIOLOGY:   Negative for chest pain, palpitations, murmurs, irregular heart beat, shortness of breath, leg edema, dyspnea on exertion, fatigue, dizziness.           RESPIRATORY:   Negative for chest pain, shortness of breath, swelling of the legs, asthma/copd, chest congestion, pain with breathing .           GASTROENTEROLOGY:   Negative for nausea, vomitting, heartburn, constipation, diarrhea, blood in stool, change in bowel habits, black stool.           HEMATOLOGY/LYMPH:   Negative for fatigue, loss of appetitie, easy bruising, easy bleeding, anemia, abnormal bleeding, slow healing.           ENDOCRINOLOGY:   Negative for polyuria, polydipsia, polyphagia, fatigue, weight loss, weight gain, cold intolerance, heat intolerance, diabetes.           MUSCULOSKELETAL:   Positive  for LUMBAR spine pain       DERMATOLOGY:   Negative for rash, bruising.           NEUROLOGY:   Negative for tingling, numbness, gait abnormality, paresthesias, weakness, sciatica.     General (SPINE):  Location: Lumbar spine with radiculitis into the  LEFT leg.    Erythema: Negative.   Edema: Negative.   Effusion: Negative.   TART Findings: Positive,Tissue Texture Changes,Assymmetry,Restriction,Tenderness paraspinal muscles lower lumbar spine LEFT > RIGHT .   Warmth: Negative.   Ecchymosis/Bruising: Negative.   Percussion Test (LUMBAR): Negative.   Tuning Fork Test (LUMBAR): Negative.   Percussion (Sacrum): Negative.   Tuning Fork (Sacrum): Negative.   Abrasions: Negative.   Orientation (LUMBAR): Positive: Decreased,Lumbar,Lordosis,because of muscle spasms.   Orientation (Sacrum): Negative.      ROM (LUMBAR): Positive, Decreased because of pain, Forward Flexion, Extension, Lateral Bending (Side Bending), and Twisting (Rotation).   ROM (Sacrum): , Positive: , LEFT , RIGHT rotation on RIGHT axis.   Findings remain relatively the same since last visit     Muscle Strength:   +5/+5 Hamstring Flexion  +5/+5 Quadricep Extension            +5/+5 Hip Flexion  +5/+5 Hip Extension            +5/+5 Hip ABduction toward body  +5/+5 Hip ADduction away from body                   +5/+5 Hip Internal Rotation at 90 Degrees  +5/+5 Hip External Rotation at 90 Degrees                +5/+5 Hip Internal Rotation at 0 Degrees  +5/+5 Hip External Rotation at 0 Degrees.      DTR/Neurological: 2-Point Discrimination:Negative,Toe Walk normal,Heel Walk normal ,   +2/+4 Patellar Reflex (L-4)  +2/+4 Posterior Tibialis and Medial Hamstrings Reflex (L5)  +2/+4 Achilles Reflex (S-1).      Sensation/Neurological Lumbar: , ,  L1: Low back, hips, and groin  L2: Low back and front of inside of upper leg/thigh  L3: Low back and front of upper leg/thigh  L4: Low back, front of upper leg/thigh, front of lower leg/calf, front of medial area of knee, and inside of ankle  L5: Low back, front and lateral knee, front and outside of lower leg/calf, top and bottom of foot and first four toes especially big toe.   Findings improved since last visit     Sensation/Neurological Sacrum: Postive, Decreased,  S1: low back,  back of upper leg/thigh, back and inside of lower leg, calf and little toe  S2: Buttocks, genitals, back of upper leg/thigh and calves  S3: Buttocks and genitals  S4: Buttocks  S5: Buttocks.      Sensation/Neurological Coccygeal: Negative, Sensation Intact, 2-Point Discrimination: Negative  Coccyx: Buttocks and area of tailbone.   Palpation: Positive,Tender to Palpation,Lumbar spine as well as,Paraspinal Musculature, and SI joint  LEFT > RIGHT L3-L5 LEFT worse than RIGHT     Vascular: Capillary Refill < 2 seconds  +2/+4Carotid  +2/+4 Dorsalis Pedis  +2/+4 Posterior Tibial.   Feet/Foot B/L Valgus Foot.      Range of Motion of the Spine:  Overview of Tests for Evaluating Spinal Function: Negative.   Ypaeshgxfc-qe-qijye Distance Test in Flexion: Positive.   Shannon Sign: Negative.   Schober Sign: Negative.   Skin-Rolling Test (Wesson Fold Test): Negative.      Low Back-Disc Injury:  Valsalva Maneuver: Negative.   Lhermitte's Sign: Negative.   Fermoral Nerve Traction Test: Positive.   Slump Test:Negative .   Cross Test Seated: Positive.   Seated Straight Leg Raise: Negative  Laseague Sign: Positive.   Laseague Differential Test: Positive.   [place]ague Drop Test: Positive.   Seated Laseague Test: Negative  Reverse Laseague Test: . Negative  Bonnet Piriformis Test: Negative.   Bragard Test: Negative.   Duchenne Trendelenberg Test: Negative.   Kernig-Brudzinski Test: Negative.   Tip Toe Heel Walking Test: Negative.   Elizabeth Prone Knee Flexion Test: Positive.   Lying Straight Leg Raise: Positive on the RIGHT referring to the LEFT    Low Back-Hip:  Earl/KYLE Test: Negative.   FADIR Test: Negative.   Iliolumbar Ligament Test: Negative.   Sacrospinous and SI Ligament Test: Negative.   Sacrotuberal Ligament Test: Negative.   Psoas Sign: Positive     Low Back-Sciatica:  Bowden Test: Negative.      Low Back-SI Joint:  Three-Phase Hyperextension Test: Positive.   Spine Test: Negative.   Yeoman Test: Negative.   Marlon Test:  "Negative.   Sacroilliac Stress Test: Positive.   Abduction Stress Test: Positive.      Low Back-Spondy:  Stork Test: Negative.   Sphinx Test: , Positive.   Modified Sphinx Test: Negative.      Hip/Pelvis - Sacrum:  Leg Length Supine: , Positive: , Left shorter than right verified with standing erect pelvis X-ray.   Leg Length Supine to Seated (Derbolowsky Sign): , Positive: , Left shorter than right verified with standing erect pelvis X-ray.  Standing Flexion Test: , Positive: , LEFT.   Seated Flexion Test: , Positive: , LEFT.   Spring Test: , Negative.   Sacral Somatic Dysfunction: , Positive: , RIGHT rotation on RIGHT axis.   Sacroiliac (SI) Joint Fixation Test:  Positive.   Hip Flexor Tightness: , Positive: , RIGHT > LEFT.   Hamstring Tightness:  Positive LEFT > RIGHT .      Last Recorded Vitals  /76 (BP Location: Right arm, Patient Position: Sitting, BP Cuff Size: Large adult)   Pulse 80   Ht 1.702 m (5' 7\")   Wt 95.3 kg (210 lb)   BMI 32.89 kg/m²       Imaging and Diagnostics Review:  MR LUMBAR SPINE WO IV CONTRAST; 10/7/2023 9:00 am  INDICATION: Signs/Symptoms:MRI LUMBAR WO.  COMPARISON: None     ACCESSION NUMBER(S): [id number]  ORDERING CLINICIAN: FABIOLA JONES      TECHNIQUE: PROCEDURE: T1 and T2 sagittal and axial images were obtained on a 1.5  Iram MRI without contrast.      FINDINGS:  There is preserved lordosis. Marrow signal is indicative of an prominent vascular bundle L5 posterior mid vertebral body, normal variant. The conus medullaris ends at the inferior end plate of T12 and is normal. Vertebral body heights are preserved. No wedge compression or compression injury. Posterior elements are intact. Tarlov cysts are seen bilaterally posterior to S2 and S3 there is mild disc desiccation and disc height loss at each level of the lumbar spine with minimal disc bulge on the sagittal sequences and widely patent canal at each level.      Cystic structure right adnexa is simple in appearance by " MR measures 2.6 cm diameter.      Axial images demonstrate the following:    T12/L1: No disc osteophyte complex, foraminal narrowing or canal stenosis. Normal.      L1/2: No disc osteophyte complex, foraminal narrowing or canal stenosis. Normal.      L2/3: No disc osteophyte complex, foraminal narrowing or canal stenosis. Normal.      L3/4: There is a minimal disc bulge without canal stenosis or foraminal narrowing with mild ligamentum flavum hypertrophy and facet hypertrophy.      L4/5: Mild broad-based disc bulge and mild to moderate ligamentum flavum hypertrophy with very mild left-greater-than-right facet hypertrophy without foraminal narrowing or canal stenosis.      L5/S1: Mild-to-moderate broad-based disc bulge is seen without foraminal narrowing or canal stenosis as the thecal sac tapers.       IMPRESSION:  1. Mild spondylosis lumbar spine as described without foraminal narrowing or canal stenosis.  2. Right adnexal cyst likely within the ovary. Routine outpatient pelvic ultrasound with transvaginal imaging recommended for further evaluation. Follow up with PCP and OB-GYN  3. Disc bulges L3-L4, L4-L5, and L5-S1      Signed by: Regulo Fry 10/7/2023 11:42 AM  Dictation workstation:   [id number]  ------------------------------------------  PROCEDURE:         SPINE LUMBOSACRAL MIN 4 VIEW - TXR  0034  REASON FOR EXAM: OSTEOARTHRITIS OF SPINE WITH RADICULOPATHY,LUMBOSACRAL REGION  RESULT: MRN: [id number]     Patient Name: SIERRA RUSSELL  STUDY: SPINE LUMBOSACRAL MIN 4 VIEW 9/19/2023 10:21 am  INDICATION: OSTEOARTHRITIS OF SPINE WITH RADICULOPATHY,LUMBOSACRAL REGION 48-year-old  woman with lower back pain, left hip pain, possible leg length discrepancy.  COMPARISON: Lumbar spine radiographs 05/28/2013     ACCESSION NUMBER(S): [id number]  ORDERING CLINICIAN: FABIOLA JONES     TECHNIQUE: 5 views Of the lumbar spine were performed.     FINDINGS:  Lumbar vertebral body heights are preserved. No compression deformity  detected. There is no sign of spondylolisthesis. Small anterior osteophyte formation is seen at the superior endplate of T11. Lumbar disc heights overall appear maintained. There is facet hypertrophy at L3-L4 through L5-S1.     IMPRESSION:  Mild lumbar spondylosis, as detailed above.     Dictation workstation:   [id number]   Original Interpreting Physician:   JAIMEE MARVIN MD  Original Transcribed by/Date: [facility] Sep 19 2023  9:43A  ------------------------------------------  Left leg shorter than right leg by the following:  Iliac crest:  9.5 mm  Sacral base:  6.4 mm  Midline femoral heads:  5.2 mm  Median difference of the left leg being shorter than right leg is approximately:  7.03 mm      PROCEDURE:         PELVIS 1 OR 2 VIEW - TXR  0039  REASON FOR EXAM: OSTEOARTHRITIS OF SPINE WITH RADICULOPATHY,LUMBOSACRAL REGION  RESULT: MRN: [id number]     Patient Name: SIERRA RUSSELL  STUDY: PELVIS 1 OR 2 VIEW; 9/19/2023 10:21 am  INDICATION: OSTEOARTHRITIS OF SPINE WITH RADICULOPATHY,LUMBOSACRAL REGION. 48-year-old  woman with lower back pain, left hip pain, possible leg length discrepancy.  COMPARISON: None available.     ACCESSION NUMBER(S): [id number]  ORDERING CLINICIAN: FABIOLA JONES     TECHNIQUE: Single weight-bearing AP view of the pelvis obtained.     FINDINGS:  Bilateral pelvic bones overall appear intact. The ilioischial and iliopectineal lines are maintained. Presumed bilateral tubal ligation clips overlying the pelvis. On this weight-bearing AP view, the right femoral head is located approximately 5 mm above the left.     IMPRESSION:  On this upright weight-bearing view of the pelvis, the right femoral head is located approximately 5 mm above the left.     Dictation workstation:   [id number]     Original Interpreting Physician:   JAIMEE MARVIN MD  Original Transcribed by/Date: [facility] Sep 19 2023  9:43A  ------------------------------------------  PROCEDURE:         HIP LT 2 VIEW W OR WO AP PELVIS -  TXR  [id number]  REASON FOR EXAM: LEFT HIP PAIN  RESULT: MRN: [id number]     Patient Name: SIERRA RUSSELL  STUDY: HIP LT 2 VIEW W OR WO AP PELVIS; 9/19/2023 10:21 am  INDICATION: LEFT HIP PAIN. 48-year-old woman with lower back pain, left hip pain, possible leg length discrepancy.  COMPARISON: None available.     ACCESSION NUMBER(S): [id number]  ORDERING CLINICIAN: FABIOLA JONES     TECHNIQUE: Two views of the left hip were obtained.     FINDINGS:  No sign of acute left hip fracture or dislocation. The left femoroacetabular joint space overall appears maintained. No radiopaque foreign body. Presumed tubal ligation clip overlying the left hemipelvis.     IMPRESSION:  No sign of acute left hip fracture or dislocation.     Dictation workstation:   [id number]  Original Interpreting Physician:   JAIMEE MARVIN MD  Original Transcribed by/Date: [facility] Sep 19 2023  9:43A    Assessment   1. Bulging lumbar disc  Referral to Pain Management    cyclobenzaprine (Flexeril) 10 mg tablet    Disc bulges L3-L4, L4-L5, and L5-S1      2. Discitis of lumbar region  Referral to Pain Management    cyclobenzaprine (Flexeril) 10 mg tablet      3. Paraspinal muscle spasm  Referral to Pain Management    cyclobenzaprine (Flexeril) 10 mg tablet      4. Radiculitis  Referral to Pain Management    cyclobenzaprine (Flexeril) 10 mg tablet      5. Acute low back pain, unspecified back pain laterality, unspecified whether sciatica present  Referral to Pain Management    cyclobenzaprine (Flexeril) 10 mg tablet      6. Tarlov cysts      Tarlov cysts are seen bilaterally posterior to S2 and S3      7. Other spondylosis, lumbar region  Referral to Pain Management    cyclobenzaprine (Flexeril) 10 mg tablet    Spondylosis L3-S1      8. Osteoarthritis of spine with radiculopathy, lumbosacral region  Referral to Pain Management    cyclobenzaprine (Flexeril) 10 mg tablet    DJD/DDD L3-S1      9. Somatic dysfunction of lumbar region  Referral to Pain Management     cyclobenzaprine (Flexeril) 10 mg tablet    Lumbar somatic dysfunction L5 side bent right rotated left  Additionally L2-L3 side bent left rotated left      10. Somatic dysfunction of sacral region  Referral to Pain Management    cyclobenzaprine (Flexeril) 10 mg tablet    Sacral somatic dysfunction right rotation on right axis      11. Lumbosacral strain, subsequent encounter  Referral to Pain Management    cyclobenzaprine (Flexeril) 10 mg tablet      12. Left hip pain  Referral to Pain Management    cyclobenzaprine (Flexeril) 10 mg tablet      13. Strain of left hip adductor muscle, subsequent encounter  Referral to Pain Management    cyclobenzaprine (Flexeril) 10 mg tablet      14. Iliotibial band syndrome, left leg  Referral to Pain Management    cyclobenzaprine (Flexeril) 10 mg tablet      15. Hamstring tightness  Referral to Pain Management    cyclobenzaprine (Flexeril) 10 mg tablet      16. Hip flexor tightness, unspecified laterality  Referral to Pain Management    cyclobenzaprine (Flexeril) 10 mg tablet      17. DDD (degenerative disc disease), lumbar  Referral to Pain Management    cyclobenzaprine (Flexeril) 10 mg tablet      18. Leg length discrepancy  Referral to Pain Management    cyclobenzaprine (Flexeril) 10 mg tablet    Median difference of the left leg being shorter than right leg is approximately:  7.03 mm      19. Valgus deformity of both feet  Referral to Pain Management    cyclobenzaprine (Flexeril) 10 mg tablet      20. Cyst of right ovary  Referral to Pain Management    cyclobenzaprine (Flexeril) 10 mg tablet          Treatment or Intervention:  Once again may continue to alternate ice and moist heat as needed  Once again discussed herniated/bulging disc(s) in detail with the patient to the level of their understanding; as well as discussed degenerative changes and somatic dysfunctions   May give patient a spinal orthosis in the future however she continues to not want one at this  time  Continue with physical therapy 1-2 times a week for 10-12 weeks with manual therapy, dry needling, IASTM, and hold off on traction since caused her more issues however may also add aquatic therapy once again, reviewed home stretching exercises to be performed by the patient routinely   Once again stressed the importance of wearing shoes with good stability control to help with the biomechanics affecting the spine  Once again, stressed the importance of wearing full foot insoles to help with the biomechanics affecting the spine and to provide cushioning  Once again, recommendation patient verify coverage with their insurance for custom orthotics and to advise the office so a referral to podiatry can be provided   Once again, recommendation over-the-counter vitamin-D 2 -3000+ milligrams a day, as well as a daily multivitamin.  Once again, recommendation to continue over-the-counter curcumin, turmeric, boswellia, as well as egg shell membrane as directed to aid with joint inflammation.  Not able to use the move Free because of reflux  May continue to take previously prescribed Flexeril (Cyclobenzaprine) 10 milligrams, may take 1 tablet 1-2 hours before bedtime as needed if have to go to school or work the next day otherwise can take 1 pill up to 3 times a day as needed for muscle relaxation new prescription given for patient dispense 30 with 1 refill  Additionally the patient may alternate with over-the-counter Advil liquid gels 2-3 every 8 hours as needed and alternate as needed with OTC Tylenol Arthritis, taking one every 6-8 hours with food as needed.  Patient advised regarding the risks and/or potential adverse reactions and/or side effects of any prescribed medications along with any over-the-counter medications or any supplements used. Patient advised to seek immediate medical care if any adverse reactions occur. The patient and/or patient(s) parent(s) verbalized their understanding   Stressed the  importance of continuing 6 millimeter heel lift to be placed in the LEFT shoe to accommodate for leg length discrepancy found on standing erect pelvis xray  Possibility in the future of LEFT hip MRI or MR Arthrogram to rule out labrum tear versus strain versus DJD versus other.  Once again, discussed the importance of modifying her activity level for the time being as she is doing a lot of repetitive activity as well stressed the importance of flexibility and core exercises  Went over workout modifications with the patient  Referral sent to Dr. Silveira for the possibility of epidural injections versus other treatments.   Also referral to [facility] massage therapist   follow up in January for reevaluation or sooner if needed.     Diagnostic studies:  No additional imaging or laboratory studies are needed at this time.    Activity Instructions, Restrictions, and Accommodations:  The family has been provided a note (after visit summary) outlining all current activity instructions, restrictions, and accommodations.    Consultations/Referrals:  Physical therapy    Follow-up in January for reevaluation or sooner as needed. IMaryuri, attest this documentation has been prepared under the direction and in the presence of Beba Esparza D.O. By signing below, IFabiola D.O, personally performed the services described in this documentation. All medical record entries made by the scribe were at my direction and in my presence. I have reviewed the chart and agree that the record reflects my personal performance and is accurate and complete. Please note that this report has been partially produced using speech recognition software. It may contain errors related to grammar, punctuation or spelling. Electronically signed, but not reviewed. Fabiola Esparza D.O. Director of Sports Medicine.     FABIOLA ESPARZA on 11/29/23 at 8:32 AM.     Fabiola Esparza DO, FAOASM

## 2023-11-24 ENCOUNTER — TREATMENT (OUTPATIENT)
Dept: PHYSICAL THERAPY | Facility: CLINIC | Age: 48
End: 2023-11-24
Payer: COMMERCIAL

## 2023-11-24 DIAGNOSIS — S39.012A LUMBAR STRAIN: ICD-10-CM

## 2023-11-24 PROCEDURE — 97035 APP MDLTY 1+ULTRASOUND EA 15: CPT | Mod: GP | Performed by: PHYSICAL THERAPIST

## 2023-11-24 PROCEDURE — 97110 THERAPEUTIC EXERCISES: CPT | Mod: GP | Performed by: PHYSICAL THERAPIST

## 2023-11-24 PROCEDURE — 97014 ELECTRIC STIMULATION THERAPY: CPT | Mod: GP | Performed by: PHYSICAL THERAPIST

## 2023-11-24 NOTE — PROGRESS NOTES
Physical Therapy    Physical Therapy    Physical Therapy Treatment    Patient Name: Alyson Jones  MRN: [id number]  Today's Date: 11/24/2023    Time Calculation  Start Time: 0901  Stop Time: 1010  Time Calculation (min): 69 min    Visit #: 5 out of 10    Evaluation date: 9/20/23    Current Problem:   1. Lumbar strain  Follow Up In Physical Therapy        SUBJECTIVE:   The patient reports that she is able to rotate better today versus last session.  She continues to voice moderate to severe pain on a daily basis.       Precautions: None     Pain: Current pain level: 3/10 at present.       OBJECTIVE:    Tender along left lumbar paraspinals.    Treatments:  Therapeutic Exercise: (15 minutes)  Bridging 2 x 10 reps.  Standing Paloff Press with 30 pounds 2 x 10 reps.  Standing Calf Stretch on Wedge 3 x 30 seconds.  Review of Home Program.    Manual Therapy: (6 minutes)  Myofascial Release to left lumbar paraspinals.    STM to lumbar paraspinals.    Ultrasound to left Lumbar PS at 1.5 W/cm2, 1 MHz x 10 minutes.    IFC to left lumbar Paraspinals x 15 minutes.    Mechanical Traction (Lumbar)  80 pounds/40 pounds, Hold 60 seconds/rest 20 seconds.  8 minutes Intermittent, with cold pack to full back.    HEP:  Continue with previous.    ASSESSMENT:   The patient noted soreness after today's session.  No significant change in pain level.  Pt seems to be getting more frustrated with lack of progress. Poor tolerance to mechanical traction today.  Patient noted more left hip pain during use.  Traction was discontinued.     PLAN:   Continue with plan of care.    Skilled PT   1-2 times per week.  Duration:8-10 weeks

## 2023-11-29 ENCOUNTER — OFFICE VISIT (OUTPATIENT)
Dept: SPORTS MEDICINE | Facility: CLINIC | Age: 48
End: 2023-11-29
Payer: COMMERCIAL

## 2023-11-29 VITALS
SYSTOLIC BLOOD PRESSURE: 96 MMHG | HEART RATE: 88 BPM | DIASTOLIC BLOOD PRESSURE: 68 MMHG | BODY MASS INDEX: 33.27 KG/M2 | HEIGHT: 67 IN | WEIGHT: 212 LBS

## 2023-11-29 DIAGNOSIS — S39.012D LUMBOSACRAL STRAIN, SUBSEQUENT ENCOUNTER: ICD-10-CM

## 2023-11-29 DIAGNOSIS — S76.012D STRAIN OF LEFT HIP ADDUCTOR MUSCLE, SUBSEQUENT ENCOUNTER: ICD-10-CM

## 2023-11-29 DIAGNOSIS — Q66.6 VALGUS DEFORMITY OF BOTH FEET: ICD-10-CM

## 2023-11-29 DIAGNOSIS — M51.36 DDD (DEGENERATIVE DISC DISEASE), LUMBAR: ICD-10-CM

## 2023-11-29 DIAGNOSIS — M62.830 PARASPINAL MUSCLE SPASM: ICD-10-CM

## 2023-11-29 DIAGNOSIS — M99.03 SOMATIC DYSFUNCTION OF LUMBAR REGION: ICD-10-CM

## 2023-11-29 DIAGNOSIS — M99.04 SOMATIC DYSFUNCTION OF SACRAL REGION: ICD-10-CM

## 2023-11-29 DIAGNOSIS — M47.896 OTHER SPONDYLOSIS, LUMBAR REGION: ICD-10-CM

## 2023-11-29 DIAGNOSIS — M25.552 LEFT HIP PAIN: ICD-10-CM

## 2023-11-29 DIAGNOSIS — N83.201 CYST OF RIGHT OVARY: ICD-10-CM

## 2023-11-29 DIAGNOSIS — M54.50 ACUTE LOW BACK PAIN, UNSPECIFIED BACK PAIN LATERALITY, UNSPECIFIED WHETHER SCIATICA PRESENT: ICD-10-CM

## 2023-11-29 DIAGNOSIS — G96.191 TARLOV CYSTS: ICD-10-CM

## 2023-11-29 DIAGNOSIS — M51.36 BULGING LUMBAR DISC: Primary | ICD-10-CM

## 2023-11-29 DIAGNOSIS — M47.27 OSTEOARTHRITIS OF SPINE WITH RADICULOPATHY, LUMBOSACRAL REGION: ICD-10-CM

## 2023-11-29 DIAGNOSIS — M54.10 RADICULITIS: ICD-10-CM

## 2023-11-29 DIAGNOSIS — M24.559 HIP FLEXOR TIGHTNESS, UNSPECIFIED LATERALITY: ICD-10-CM

## 2023-11-29 DIAGNOSIS — M76.32 ILIOTIBIAL BAND SYNDROME, LEFT LEG: ICD-10-CM

## 2023-11-29 DIAGNOSIS — M21.70 LEG LENGTH DISCREPANCY: ICD-10-CM

## 2023-11-29 DIAGNOSIS — M62.89 HAMSTRING TIGHTNESS: ICD-10-CM

## 2023-11-29 DIAGNOSIS — M46.46 DISCITIS OF LUMBAR REGION: ICD-10-CM

## 2023-11-29 PROCEDURE — 1036F TOBACCO NON-USER: CPT | Performed by: FAMILY MEDICINE

## 2023-11-29 PROCEDURE — 99214 OFFICE O/P EST MOD 30 MIN: CPT | Performed by: FAMILY MEDICINE

## 2023-11-29 RX ORDER — CYCLOBENZAPRINE HCL 10 MG
10 TABLET ORAL 3 TIMES DAILY PRN
Qty: 30 TABLET | Refills: 0 | Status: CANCELLED | OUTPATIENT
Start: 2023-11-29 | End: 2023-12-09

## 2023-11-29 RX ORDER — BUPROPION HYDROCHLORIDE 150 MG/1
150 TABLET, EXTENDED RELEASE ORAL DAILY
COMMUNITY

## 2023-11-29 RX ORDER — CYCLOBENZAPRINE HCL 10 MG
10 TABLET ORAL NIGHTLY PRN
Qty: 30 TABLET | Refills: 1 | Status: SHIPPED | OUTPATIENT
Start: 2023-11-29 | End: 2024-01-28

## 2023-11-29 ASSESSMENT — PAIN - FUNCTIONAL ASSESSMENT: PAIN_FUNCTIONAL_ASSESSMENT: 0-10

## 2023-11-29 ASSESSMENT — PAIN SCALES - GENERAL: PAINLEVEL_OUTOF10: 5 - MODERATE PAIN

## 2023-11-29 NOTE — PATIENT INSTRUCTIONS
Once again may continue to alternate ice and moist heat as needed  Once again reviewed handout on herniated/bulging disc(s) in detail with the patient to the level of their understanding; a copy of this handout was provided to the patient at the time of this office visit.  May give patient a spinal orthosis in the future  Continue with physical therapy 1-2 times a week for 10-12 weeks with manual therapy, dry needling, IASTM, and cervical traction  Once again, reviewed home stretching exercises to be performed by the patient routinely #6 Stressed the importance of wearing shoes with good stability control to help with the biomechanics affecting the spine  Once again, stressed the importance of wearing full foot insoles to help with the biomechanics affecting the spine and to provide cushioning  Once again, recommendation patient verify coverage with their insurance for custom orthotics and to advise the office so a referral to podiatry can be provided   Once again, recommendation over-the-counter vitamin-D 2 -3000+ milligrams a day, as well as a daily multivitamin.  Once again, recommendation to continue over-the-counter curcumin, turmeric, boswellia, as well as egg shell membrane as directed to aid with joint inflammation.  Once again, recommendation to continue over-the-counter Move Free for joint health.  May continue to take previously prescribed Flexeril (Cyclobenzaprine) 10 milligrams, may take 1 tablet 1-2 hours before bedtime as needed if have to go to school or work the next day otherwise can take 1 pill up to 3 times a day as needed for muscle relaxation; The patient may alternate with OTC Tylenol Extra Strength or OTC Tylenol Arthritis, taking one every 6-8 hours with food as needed.  Patient advised regarding the risks and/or potential adverse reactions and/or side effects of any prescribed medications along with any over-the-counter medications or any supplements used. Patient advised to seek immediate  medical care if any adverse reactions occur. The patient and/or patient(s) parent(s) verbalized their understanding   Continue to take Advil liquid gels alternating with Tylenol as needed.   Recommended to repurchase 6 millimeter heel lift to be placed in the LEFT shoe to accommodate for leg length discrepancy found on standing erect pelvis xray  Possibility in the future of LEFT hip MRI or MR Arthrogram to rule out labrum tear versus strain versus DJD versus other.  Once again, discussed the importance of modifying her activity level for the time being as she is doing a lot of repetitive activity  Went over workout modifications with the patient  Referral sent to Dr. Silveira for the possibility of epidural injections.   Follow up in January for reevaluation or sooner if needed.

## 2023-12-28 ENCOUNTER — APPOINTMENT (OUTPATIENT)
Dept: PAIN MEDICINE | Facility: CLINIC | Age: 48
End: 2023-12-28
Payer: COMMERCIAL

## 2023-12-28 PROBLEM — I51.7 ENLARGED HEART: Status: ACTIVE | Noted: 2023-06-09

## 2023-12-28 PROBLEM — J45.20 MILD INTERMITTENT ASTHMA WITHOUT COMPLICATION (HHS-HCC): Status: ACTIVE | Noted: 2023-06-09

## 2023-12-28 PROBLEM — F32.A DEPRESSIVE DISORDER: Status: ACTIVE | Noted: 2023-06-09

## 2023-12-28 PROBLEM — M21.70 LEG LENGTH DIFFERENCE, ACQUIRED: Status: ACTIVE | Noted: 2023-12-28

## 2023-12-28 PROBLEM — E55.9 VITAMIN D DEFICIENCY, UNSPECIFIED: Status: ACTIVE | Noted: 2023-06-09

## 2023-12-28 PROBLEM — R53.82 CHRONIC FATIGUE: Status: ACTIVE | Noted: 2023-06-09

## 2023-12-28 PROBLEM — M46.46 LUMBAR DISCITIS: Status: ACTIVE | Noted: 2023-12-28

## 2023-12-28 PROBLEM — R06.02 SOB (SHORTNESS OF BREATH): Status: ACTIVE | Noted: 2023-06-09

## 2024-07-22 ENCOUNTER — DOCUMENTATION (OUTPATIENT)
Dept: PHYSICAL THERAPY | Facility: CLINIC | Age: 49
End: 2024-07-22
Payer: COMMERCIAL

## 2024-07-22 NOTE — PROGRESS NOTES
Physical Therapy    Discharge Summary    Name: Alyson Jones  MRN: [id number]  : 1975  Date: 24    Discharge Summary: PT    Discharge Information: Date of last visit 23, Date of evaluation 23, and Number of attended visits 5    Rehab Discharge Reason: Failed to schedule and/or keep follow-up appointment(s)

## 2024-08-13 ENCOUNTER — HOSPITAL ENCOUNTER (OUTPATIENT)
Dept: RADIOLOGY | Facility: HOSPITAL | Age: 49
Discharge: HOME | End: 2024-08-13
Payer: COMMERCIAL

## 2024-08-13 VITALS
SYSTOLIC BLOOD PRESSURE: 103 MMHG | OXYGEN SATURATION: 100 % | RESPIRATION RATE: 16 BRPM | HEART RATE: 50 BPM | DIASTOLIC BLOOD PRESSURE: 51 MMHG

## 2024-08-13 DIAGNOSIS — R07.89 OTHER CHEST PAIN: ICD-10-CM

## 2024-08-13 DIAGNOSIS — I20.9 ANGINA PECTORIS (CMS-HCC): ICD-10-CM

## 2024-08-13 PROCEDURE — 2550000001 HC RX 255 CONTRASTS: Performed by: INTERNAL MEDICINE

## 2024-08-13 PROCEDURE — 75574 CT ANGIO HRT W/3D IMAGE: CPT | Performed by: RADIOLOGY

## 2024-08-13 PROCEDURE — 75574 CT ANGIO HRT W/3D IMAGE: CPT

## 2024-08-13 PROCEDURE — 2500000001 HC RX 250 WO HCPCS SELF ADMINISTERED DRUGS (ALT 637 FOR MEDICARE OP): Performed by: RADIOLOGY

## 2024-08-13 RX ORDER — METOPROLOL TARTRATE 1 MG/ML
5 INJECTION, SOLUTION INTRAVENOUS ONCE AS NEEDED
Status: DISCONTINUED | OUTPATIENT
Start: 2024-08-13 | End: 2024-08-14 | Stop reason: HOSPADM

## 2024-08-13 RX ORDER — METOPROLOL TARTRATE 1 MG/ML
5 INJECTION, SOLUTION INTRAVENOUS ONCE
Status: DISCONTINUED | OUTPATIENT
Start: 2024-08-13 | End: 2024-08-14 | Stop reason: HOSPADM

## 2024-08-13 RX ORDER — LORAZEPAM 2 MG/ML
0.5 INJECTION INTRAMUSCULAR EVERY 5 MIN PRN
Status: DISCONTINUED | OUTPATIENT
Start: 2024-08-13 | End: 2024-08-14 | Stop reason: HOSPADM

## 2024-08-13 RX ORDER — METOPROLOL TARTRATE 100 MG/1
100 TABLET ORAL ONCE AS NEEDED
Status: DISCONTINUED | OUTPATIENT
Start: 2024-08-13 | End: 2024-08-14 | Stop reason: HOSPADM

## 2024-08-13 RX ORDER — NITROGLYCERIN 0.4 MG/1
0.8 TABLET SUBLINGUAL ONCE
Status: COMPLETED | OUTPATIENT
Start: 2024-08-13 | End: 2024-08-13

## 2024-08-13 RX ORDER — METOPROLOL TARTRATE 100 MG/1
100 TABLET ORAL ONCE
Status: DISCONTINUED | OUTPATIENT
Start: 2024-08-13 | End: 2024-08-14 | Stop reason: HOSPADM

## 2024-10-04 ENCOUNTER — HOSPITAL ENCOUNTER (OUTPATIENT)
Dept: RADIOLOGY | Facility: CLINIC | Age: 49
Discharge: HOME | End: 2024-10-04
Payer: COMMERCIAL

## 2024-10-04 DIAGNOSIS — Z12.31 SCREENING MAMMOGRAM FOR BREAST CANCER: ICD-10-CM

## 2024-10-04 PROCEDURE — 77067 SCR MAMMO BI INCL CAD: CPT | Performed by: STUDENT IN AN ORGANIZED HEALTH CARE EDUCATION/TRAINING PROGRAM

## 2024-10-04 PROCEDURE — 77067 SCR MAMMO BI INCL CAD: CPT

## 2024-10-04 PROCEDURE — 77063 BREAST TOMOSYNTHESIS BI: CPT | Performed by: STUDENT IN AN ORGANIZED HEALTH CARE EDUCATION/TRAINING PROGRAM
